# Patient Record
Sex: FEMALE | Race: WHITE | NOT HISPANIC OR LATINO
[De-identification: names, ages, dates, MRNs, and addresses within clinical notes are randomized per-mention and may not be internally consistent; named-entity substitution may affect disease eponyms.]

---

## 2018-07-16 PROBLEM — Z00.00 ENCOUNTER FOR PREVENTIVE HEALTH EXAMINATION: Status: ACTIVE | Noted: 2018-07-16

## 2018-07-25 ENCOUNTER — APPOINTMENT (OUTPATIENT)
Dept: SURGERY | Facility: CLINIC | Age: 78
End: 2018-07-25
Payer: MEDICARE

## 2018-07-25 VITALS
HEIGHT: 64 IN | SYSTOLIC BLOOD PRESSURE: 134 MMHG | BODY MASS INDEX: 30.05 KG/M2 | HEART RATE: 71 BPM | DIASTOLIC BLOOD PRESSURE: 85 MMHG | WEIGHT: 176 LBS

## 2018-07-25 DIAGNOSIS — Z80.0 FAMILY HISTORY OF MALIGNANT NEOPLASM OF DIGESTIVE ORGANS: ICD-10-CM

## 2018-07-25 DIAGNOSIS — Z86.39 PERSONAL HISTORY OF OTHER ENDOCRINE, NUTRITIONAL AND METABOLIC DISEASE: ICD-10-CM

## 2018-07-25 DIAGNOSIS — I48.91 UNSPECIFIED ATRIAL FIBRILLATION: ICD-10-CM

## 2018-07-25 DIAGNOSIS — Z86.79 PERSONAL HISTORY OF OTHER DISEASES OF THE CIRCULATORY SYSTEM: ICD-10-CM

## 2018-07-25 DIAGNOSIS — R73.03 PREDIABETES.: ICD-10-CM

## 2018-07-25 PROCEDURE — 99204 OFFICE O/P NEW MOD 45 MIN: CPT

## 2018-07-25 RX ORDER — NIFEDIPINE 60 MG/1
60 TABLET, FILM COATED, EXTENDED RELEASE ORAL
Refills: 0 | Status: ACTIVE | COMMUNITY

## 2018-07-25 RX ORDER — METOPROLOL TARTRATE 50 MG/1
50 TABLET, FILM COATED ORAL
Refills: 0 | Status: ACTIVE | COMMUNITY

## 2018-07-25 RX ORDER — ALENDRONATE SODIUM 70 MG/1
70 TABLET ORAL
Refills: 0 | Status: ACTIVE | COMMUNITY

## 2018-07-25 RX ORDER — RIVAROXABAN 20 MG/1
20 TABLET, FILM COATED ORAL
Refills: 0 | Status: ACTIVE | COMMUNITY

## 2018-07-25 RX ORDER — AMIODARONE HYDROCHLORIDE 200 MG/1
200 TABLET ORAL
Refills: 0 | Status: ACTIVE | COMMUNITY

## 2018-07-25 RX ORDER — SIMVASTATIN 40 MG/1
40 TABLET, FILM COATED ORAL
Refills: 0 | Status: ACTIVE | COMMUNITY

## 2018-07-30 PROBLEM — R73.03 BORDERLINE DIABETES MELLITUS: Status: RESOLVED | Noted: 2018-07-30 | Resolved: 2018-07-30

## 2018-07-30 PROBLEM — Z86.39 HISTORY OF HIGH CHOLESTEROL: Status: RESOLVED | Noted: 2018-07-30 | Resolved: 2018-07-30

## 2018-07-30 PROBLEM — Z80.0 FAMILY HISTORY OF MALIGNANT NEOPLASM OF COLON: Status: ACTIVE | Noted: 2018-07-30

## 2018-07-30 PROBLEM — Z86.79 HISTORY OF ATRIAL FIBRILLATION: Status: RESOLVED | Noted: 2018-07-30 | Resolved: 2018-07-30

## 2018-07-30 PROBLEM — Z86.79 HISTORY OF HYPERTENSION: Status: RESOLVED | Noted: 2018-07-30 | Resolved: 2018-07-30

## 2018-07-30 PROBLEM — I48.91 ATRIAL FIBRILLATION STATUS POST CARDIOVERSION: Status: RESOLVED | Noted: 2018-07-30 | Resolved: 2018-07-30

## 2018-08-08 ENCOUNTER — FORM ENCOUNTER (OUTPATIENT)
Age: 78
End: 2018-08-08

## 2018-08-09 ENCOUNTER — APPOINTMENT (OUTPATIENT)
Dept: CT IMAGING | Facility: CLINIC | Age: 78
End: 2018-08-09
Payer: MEDICARE

## 2018-08-09 ENCOUNTER — OUTPATIENT (OUTPATIENT)
Dept: OUTPATIENT SERVICES | Facility: HOSPITAL | Age: 78
LOS: 1 days | End: 2018-08-09

## 2018-08-09 DIAGNOSIS — E04.9 NONTOXIC GOITER, UNSPECIFIED: ICD-10-CM

## 2018-08-09 PROCEDURE — 70490 CT SOFT TISSUE NECK W/O DYE: CPT | Mod: 26

## 2018-09-10 ENCOUNTER — OUTPATIENT (OUTPATIENT)
Dept: OUTPATIENT SERVICES | Facility: HOSPITAL | Age: 78
LOS: 1 days | End: 2018-09-10
Payer: MEDICARE

## 2018-09-10 VITALS
SYSTOLIC BLOOD PRESSURE: 130 MMHG | OXYGEN SATURATION: 97 % | TEMPERATURE: 97 F | HEIGHT: 61.5 IN | WEIGHT: 184.09 LBS | RESPIRATION RATE: 14 BRPM | DIASTOLIC BLOOD PRESSURE: 82 MMHG | HEART RATE: 64 BPM

## 2018-09-10 DIAGNOSIS — E04.9 NONTOXIC GOITER, UNSPECIFIED: ICD-10-CM

## 2018-09-10 DIAGNOSIS — I48.91 UNSPECIFIED ATRIAL FIBRILLATION: ICD-10-CM

## 2018-09-10 DIAGNOSIS — Z96.652 PRESENCE OF LEFT ARTIFICIAL KNEE JOINT: Chronic | ICD-10-CM

## 2018-09-10 DIAGNOSIS — Z90.89 ACQUIRED ABSENCE OF OTHER ORGANS: Chronic | ICD-10-CM

## 2018-09-10 DIAGNOSIS — E11.9 TYPE 2 DIABETES MELLITUS WITHOUT COMPLICATIONS: ICD-10-CM

## 2018-09-10 DIAGNOSIS — Z01.89 ENCOUNTER FOR OTHER SPECIFIED SPECIAL EXAMINATIONS: Chronic | ICD-10-CM

## 2018-09-10 DIAGNOSIS — Z90.49 ACQUIRED ABSENCE OF OTHER SPECIFIED PARTS OF DIGESTIVE TRACT: Chronic | ICD-10-CM

## 2018-09-10 DIAGNOSIS — I10 ESSENTIAL (PRIMARY) HYPERTENSION: ICD-10-CM

## 2018-09-10 LAB
BLD GP AB SCN SERPL QL: NEGATIVE — SIGNIFICANT CHANGE UP
BUN SERPL-MCNC: 13 MG/DL — SIGNIFICANT CHANGE UP (ref 7–23)
CALCIUM SERPL-MCNC: 10.1 MG/DL — SIGNIFICANT CHANGE UP (ref 8.4–10.5)
CHLORIDE SERPL-SCNC: 100 MMOL/L — SIGNIFICANT CHANGE UP (ref 98–107)
CO2 SERPL-SCNC: 26 MMOL/L — SIGNIFICANT CHANGE UP (ref 22–31)
CREAT SERPL-MCNC: 0.83 MG/DL — SIGNIFICANT CHANGE UP (ref 0.5–1.3)
GLUCOSE SERPL-MCNC: 89 MG/DL — SIGNIFICANT CHANGE UP (ref 70–99)
HBA1C BLD-MCNC: 5.5 % — SIGNIFICANT CHANGE UP (ref 4–5.6)
HCT VFR BLD CALC: 44.1 % — SIGNIFICANT CHANGE UP (ref 34.5–45)
HGB BLD-MCNC: 14.3 G/DL — SIGNIFICANT CHANGE UP (ref 11.5–15.5)
MCHC RBC-ENTMCNC: 31.4 PG — SIGNIFICANT CHANGE UP (ref 27–34)
MCHC RBC-ENTMCNC: 32.4 % — SIGNIFICANT CHANGE UP (ref 32–36)
MCV RBC AUTO: 96.9 FL — SIGNIFICANT CHANGE UP (ref 80–100)
NRBC # FLD: 0 — SIGNIFICANT CHANGE UP
PLATELET # BLD AUTO: 311 K/UL — SIGNIFICANT CHANGE UP (ref 150–400)
PMV BLD: 11.2 FL — SIGNIFICANT CHANGE UP (ref 7–13)
POTASSIUM SERPL-MCNC: 3.7 MMOL/L — SIGNIFICANT CHANGE UP (ref 3.5–5.3)
POTASSIUM SERPL-SCNC: 3.7 MMOL/L — SIGNIFICANT CHANGE UP (ref 3.5–5.3)
RBC # BLD: 4.55 M/UL — SIGNIFICANT CHANGE UP (ref 3.8–5.2)
RBC # FLD: 13.4 % — SIGNIFICANT CHANGE UP (ref 10.3–14.5)
RH IG SCN BLD-IMP: POSITIVE — SIGNIFICANT CHANGE UP
SODIUM SERPL-SCNC: 141 MMOL/L — SIGNIFICANT CHANGE UP (ref 135–145)
T3 SERPL-MCNC: 115.8 NG/DL — SIGNIFICANT CHANGE UP (ref 80–200)
T4 FREE SERPL-MCNC: 1.89 NG/DL — HIGH (ref 0.9–1.8)
TSH SERPL-MCNC: 3 UIU/ML — SIGNIFICANT CHANGE UP (ref 0.27–4.2)
WBC # BLD: 7.47 K/UL — SIGNIFICANT CHANGE UP (ref 3.8–10.5)
WBC # FLD AUTO: 7.47 K/UL — SIGNIFICANT CHANGE UP (ref 3.8–10.5)

## 2018-09-10 PROCEDURE — 93010 ELECTROCARDIOGRAM REPORT: CPT

## 2018-09-10 RX ORDER — SIMVASTATIN 20 MG/1
1 TABLET, FILM COATED ORAL
Qty: 0 | Refills: 0 | COMMUNITY

## 2018-09-10 RX ORDER — SODIUM CHLORIDE 9 MG/ML
3 INJECTION INTRAMUSCULAR; INTRAVENOUS; SUBCUTANEOUS EVERY 8 HOURS
Qty: 0 | Refills: 0 | Status: DISCONTINUED | OUTPATIENT
Start: 2018-09-25 | End: 2018-10-10

## 2018-09-10 NOTE — H&P PST ADULT - MUSCULOSKELETAL
details… detailed exam no calf tenderness/normal strength/no joint erythema/no joint swelling/ROM intact/no joint warmth

## 2018-09-10 NOTE — H&P PST ADULT - NEGATIVE ENMT SYMPTOMS
no vertigo/no nasal discharge/no hearing difficulty/no ear pain/no nasal congestion/no sinus symptoms/no throat pain/no dysphagia

## 2018-09-10 NOTE — H&P PST ADULT - HISTORY OF PRESENT ILLNESS
poor historian 78 year old female presents to PST for nontoxic goiter.  Pt scheduled for total substernal thyroidectomy.  Pt has a history of A-Fibrillation, being seen by cardiologist Dr. Fofana.  Pt is on Xarelto.     deysi riddleian 78 year old female presents to PST for nontoxic goiter.  Pt scheduled for total substernal thyroidectomy  on 9/25/18.  Pt has a history of A-Fibrillation, being seen by cardiologist Dr. Fofana.  Pt is on Xarelto.     deysi riddleian

## 2018-09-10 NOTE — H&P PST ADULT - RS GEN PE MLT RESP DETAILS PC
breath sounds equal/no wheezes/respirations non-labored/airway patent/no rhonchi/no rales/good air movement/clear to auscultation bilaterally

## 2018-09-10 NOTE — H&P PST ADULT - PROBLEM SELECTOR PLAN 1
Pt scheduled for total substernal thyroidectomy on 9/25/18.  Pre-op instructions given, patient verbalized understanding.   Pepcid given to patient for GI prophylaxis.   Chlorhexidine wash provided, instructions given.     Medical evaluation requested by surgeon, pending medical clearance. Pt has appt with Dr. Henley 9/13.    Cardiac evaluation requested by surgeon, pending cardiac clearance. Pt has scheduled stress test and echo 9/11/18.

## 2018-09-10 NOTE — H&P PST ADULT - PMH
Atrial fibrillation    CAD (coronary artery disease)    Cataract, bilateral    Diabetes  Type II diet controlled  Hyperlipidemia    Hypertension    Low bone density    Spinal stenosis Atrial fibrillation    CAD (coronary artery disease)    Cataract, bilateral    Diabetes  Type II diet controlled  Hyperlipidemia    Hypertension    Low bone density    Nontoxic goiter, unspecified    Spinal stenosis

## 2018-09-10 NOTE — H&P PST ADULT - PROBLEM SELECTOR PLAN 2
Spoke to Surgical coordinator D of Dr. Eaton, as per Dr. Eaton pt should stop Xarelto one week prior to surgery and stay on a baby aspirin. Pt was instructed by Dr. Fofana to hold Xarelto 3 days prior to surgery. As per surgical coordinator, Dr. Eaton will speak to Dr. Fofana in regards to Xarelto instructions and relay plan to patient.

## 2018-09-10 NOTE — H&P PST ADULT - NEGATIVE NEUROLOGICAL SYMPTOMS
no focal seizures/no tremors/no vertigo/no paresthesias/no weakness/no syncope/no difficulty walking/no headache/no transient paralysis

## 2018-09-10 NOTE — H&P PST ADULT - PSH
Encounter for cardioversion procedure  April 2018  H/O total knee replacement, left  2011  S/P cholecystectomy  ~2008  S/P tonsillectomy and adenoidectomy  childhood

## 2018-09-10 NOTE — H&P PST ADULT - NSANTHOSAYNRD_GEN_A_CORE
No. IGNACIA screening performed.  STOP BANG Legend: 0-2 = LOW Risk; 3-4 = INTERMEDIATE Risk; 5-8 = HIGH Risk

## 2018-09-19 ENCOUNTER — RESULT REVIEW (OUTPATIENT)
Age: 78
End: 2018-09-19

## 2018-09-24 ENCOUNTER — TRANSCRIPTION ENCOUNTER (OUTPATIENT)
Age: 78
End: 2018-09-24

## 2018-09-25 ENCOUNTER — OUTPATIENT (OUTPATIENT)
Dept: OUTPATIENT SERVICES | Facility: HOSPITAL | Age: 78
LOS: 1 days | Discharge: HOME | End: 2018-09-25
Payer: MEDICARE

## 2018-09-25 ENCOUNTER — RESULT REVIEW (OUTPATIENT)
Age: 78
End: 2018-09-25

## 2018-09-25 ENCOUNTER — APPOINTMENT (OUTPATIENT)
Dept: SURGERY | Facility: HOSPITAL | Age: 78
End: 2018-09-25

## 2018-09-25 VITALS
HEIGHT: 61.5 IN | WEIGHT: 184.09 LBS | SYSTOLIC BLOOD PRESSURE: 138 MMHG | TEMPERATURE: 98 F | HEART RATE: 64 BPM | DIASTOLIC BLOOD PRESSURE: 68 MMHG | OXYGEN SATURATION: 95 % | RESPIRATION RATE: 16 BRPM

## 2018-09-25 VITALS
DIASTOLIC BLOOD PRESSURE: 69 MMHG | SYSTOLIC BLOOD PRESSURE: 126 MMHG | RESPIRATION RATE: 16 BRPM | OXYGEN SATURATION: 98 % | HEART RATE: 62 BPM

## 2018-09-25 DIAGNOSIS — Z01.89 ENCOUNTER FOR OTHER SPECIFIED SPECIAL EXAMINATIONS: Chronic | ICD-10-CM

## 2018-09-25 DIAGNOSIS — Z90.89 ACQUIRED ABSENCE OF OTHER ORGANS: Chronic | ICD-10-CM

## 2018-09-25 DIAGNOSIS — E04.9 NONTOXIC GOITER, UNSPECIFIED: ICD-10-CM

## 2018-09-25 DIAGNOSIS — Z96.652 PRESENCE OF LEFT ARTIFICIAL KNEE JOINT: Chronic | ICD-10-CM

## 2018-09-25 DIAGNOSIS — Z90.49 ACQUIRED ABSENCE OF OTHER SPECIFIED PARTS OF DIGESTIVE TRACT: Chronic | ICD-10-CM

## 2018-09-25 LAB
GLUCOSE BLDC GLUCOMTR-MCNC: 115 MG/DL — HIGH (ref 70–99)
RH IG SCN BLD-IMP: POSITIVE — SIGNIFICANT CHANGE UP

## 2018-09-25 PROCEDURE — 88307 TISSUE EXAM BY PATHOLOGIST: CPT | Mod: 26

## 2018-09-25 PROCEDURE — 60271 REMOVAL OF THYROID: CPT

## 2018-09-25 RX ORDER — BENZOCAINE AND MENTHOL 5; 1 G/100ML; G/100ML
1 LIQUID ORAL
Qty: 0 | Refills: 0 | COMMUNITY
Start: 2018-09-25

## 2018-09-25 RX ORDER — FENTANYL CITRATE 50 UG/ML
25 INJECTION INTRAVENOUS
Qty: 0 | Refills: 0 | Status: DISCONTINUED | OUTPATIENT
Start: 2018-09-25 | End: 2018-09-25

## 2018-09-25 RX ORDER — SODIUM CHLORIDE 9 MG/ML
1000 INJECTION, SOLUTION INTRAVENOUS
Qty: 0 | Refills: 0 | Status: DISCONTINUED | OUTPATIENT
Start: 2018-09-25 | End: 2018-09-25

## 2018-09-25 RX ORDER — NIFEDIPINE 30 MG
1 TABLET, EXTENDED RELEASE 24 HR ORAL
Qty: 0 | Refills: 0 | COMMUNITY

## 2018-09-25 RX ORDER — BENZOCAINE AND MENTHOL 5; 1 G/100ML; G/100ML
1 LIQUID ORAL
Qty: 0 | Refills: 0 | Status: DISCONTINUED | OUTPATIENT
Start: 2018-09-25 | End: 2018-10-10

## 2018-09-25 RX ORDER — RIVAROXABAN 15 MG-20MG
1 KIT ORAL
Qty: 0 | Refills: 0 | COMMUNITY

## 2018-09-25 RX ORDER — FENTANYL CITRATE 50 UG/ML
50 INJECTION INTRAVENOUS
Qty: 0 | Refills: 0 | Status: DISCONTINUED | OUTPATIENT
Start: 2018-09-25 | End: 2018-09-25

## 2018-09-25 RX ORDER — AMIODARONE HYDROCHLORIDE 400 MG/1
1 TABLET ORAL
Qty: 0 | Refills: 0 | COMMUNITY

## 2018-09-25 RX ORDER — ONDANSETRON 8 MG/1
4 TABLET, FILM COATED ORAL ONCE
Qty: 0 | Refills: 0 | Status: COMPLETED | OUTPATIENT
Start: 2018-09-25 | End: 2018-09-25

## 2018-09-25 RX ORDER — SIMVASTATIN 20 MG/1
1 TABLET, FILM COATED ORAL
Qty: 0 | Refills: 0 | COMMUNITY

## 2018-09-25 RX ORDER — CALCITRIOL 0.5 UG/1
1 CAPSULE ORAL
Qty: 0 | Refills: 0 | COMMUNITY
Start: 2018-09-25

## 2018-09-25 RX ORDER — OMEGA-3 ACID ETHYL ESTERS 1 G
1 CAPSULE ORAL
Qty: 0 | Refills: 0 | COMMUNITY

## 2018-09-25 RX ORDER — ACETAMINOPHEN 500 MG
650 TABLET ORAL EVERY 6 HOURS
Qty: 0 | Refills: 0 | Status: DISCONTINUED | OUTPATIENT
Start: 2018-09-25 | End: 2018-10-10

## 2018-09-25 RX ORDER — LOSARTAN/HYDROCHLOROTHIAZIDE 100MG-25MG
1 TABLET ORAL
Qty: 0 | Refills: 0 | COMMUNITY

## 2018-09-25 RX ORDER — ACETAMINOPHEN 500 MG
2 TABLET ORAL
Qty: 0 | Refills: 0 | COMMUNITY
Start: 2018-09-25

## 2018-09-25 RX ORDER — OXYCODONE HYDROCHLORIDE 5 MG/1
5 TABLET ORAL ONCE
Qty: 0 | Refills: 0 | Status: DISCONTINUED | OUTPATIENT
Start: 2018-09-25 | End: 2018-09-25

## 2018-09-25 RX ORDER — CHOLECALCIFEROL (VITAMIN D3) 125 MCG
1 CAPSULE ORAL
Qty: 0 | Refills: 0 | COMMUNITY

## 2018-09-25 RX ORDER — METHIMAZOLE 10 MG/1
1 TABLET ORAL
Qty: 0 | Refills: 0 | COMMUNITY

## 2018-09-25 RX ORDER — CALCITRIOL 0.5 UG/1
0.5 CAPSULE ORAL EVERY 12 HOURS
Qty: 0 | Refills: 0 | Status: DISCONTINUED | OUTPATIENT
Start: 2018-09-25 | End: 2018-10-10

## 2018-09-25 RX ORDER — SODIUM CHLORIDE 9 MG/ML
1000 INJECTION, SOLUTION INTRAVENOUS
Qty: 0 | Refills: 0 | Status: DISCONTINUED | OUTPATIENT
Start: 2018-09-25 | End: 2018-10-10

## 2018-09-25 RX ORDER — CALCITRIOL 0.5 UG/1
0.5 CAPSULE, LIQUID FILLED ORAL
Qty: 60 | Refills: 1 | Status: ACTIVE | COMMUNITY
Start: 2018-09-25 | End: 1900-01-01

## 2018-09-25 RX ORDER — METOPROLOL TARTRATE 50 MG
1 TABLET ORAL
Qty: 0 | Refills: 0 | COMMUNITY

## 2018-09-25 RX ORDER — ALENDRONATE SODIUM 70 MG/1
1 TABLET ORAL
Qty: 0 | Refills: 0 | COMMUNITY

## 2018-09-25 RX ADMIN — FENTANYL CITRATE 50 MICROGRAM(S): 50 INJECTION INTRAVENOUS at 14:00

## 2018-09-25 RX ADMIN — ONDANSETRON 4 MILLIGRAM(S): 8 TABLET, FILM COATED ORAL at 13:42

## 2018-09-25 RX ADMIN — CALCITRIOL 0.5 MICROGRAM(S): 0.5 CAPSULE ORAL at 17:33

## 2018-09-25 RX ADMIN — SODIUM CHLORIDE 30 MILLILITER(S): 9 INJECTION, SOLUTION INTRAVENOUS at 10:59

## 2018-09-25 RX ADMIN — Medication 2 TABLET(S): at 15:49

## 2018-09-25 RX ADMIN — SODIUM CHLORIDE 75 MILLILITER(S): 9 INJECTION, SOLUTION INTRAVENOUS at 13:50

## 2018-09-25 RX ADMIN — BENZOCAINE AND MENTHOL 1 LOZENGE: 5; 1 LIQUID ORAL at 17:33

## 2018-09-25 RX ADMIN — SODIUM CHLORIDE 3 MILLILITER(S): 9 INJECTION INTRAMUSCULAR; INTRAVENOUS; SUBCUTANEOUS at 13:42

## 2018-09-25 RX ADMIN — FENTANYL CITRATE 50 MICROGRAM(S): 50 INJECTION INTRAVENOUS at 14:15

## 2018-09-25 NOTE — ASU PATIENT PROFILE, ADULT - PMH
Atrial fibrillation    CAD (coronary artery disease)    Cataract, bilateral    Diabetes  Type II diet controlled  Hyperlipidemia    Hypertension    Low bone density    Nontoxic goiter, unspecified    Spinal stenosis

## 2018-09-25 NOTE — BRIEF OPERATIVE NOTE - OPERATION/FINDINGS
Total thyroidectomy. Recurrent laryngeal nerve identified bilaterally. Drain left in pre-tracheal space.

## 2018-09-25 NOTE — ASU DISCHARGE PLAN (ADULT/PEDIATRIC). - MEDICATION SUMMARY - MEDICATIONS TO STOP TAKING
I will STOP taking the medications listed below when I get home from the hospital:    Xarelto 20 mg oral tablet  -- 1 tab(s) by mouth once a day in am    methIMAzole 5 mg oral tablet  -- 1 tab(s) by mouth once a day in am    calcium 1200 mg orally 2x/day    Vitamin D3 1000 intl units oral tablet  -- 1 tab(s) by mouth 2 times a day    Fish Oil 1200 mg oral capsule  -- 1 cap(s) by mouth once a day last dose 9/18    MVI 1 tab orally daily  last dose  9 /18    COQ10 100 mg orally daily  last dose 9/18

## 2018-09-25 NOTE — ASU DISCHARGE PLAN (ADULT/PEDIATRIC). - MEDICATION SUMMARY - MEDICATIONS TO TAKE
I will START or STAY ON the medications listed below when I get home from the hospital:    acetaminophen 325 mg oral tablet  -- 2 tab(s) by mouth every 6 hours, As needed, Moderate Pain (4 - 6)  -- Indication: For pain    amiodarone 200 mg oral tablet  -- 1 tab(s) by mouth once a day in am  -- Indication: For home    simvastatin 40 mg oral tablet  -- 1 tab(s) by mouth once a day in am  -- Indication: For home    losartan-hydrochlorothiazide 50mg-12.5mg oral tablet  -- 1 tab(s) by mouth once a day in am  -- Indication: For home    metoprolol tartrate 50 mg oral tablet  -- 1 tab(s) by mouth 2 times a day  -- Indication: For hme    Fosamax 70 mg oral tablet  -- 1 tab(s) by mouth once a week on Monday  -- Indication: For hme    NIFEdipine 60 mg oral tablet, extended release  -- 1 tab(s) by mouth 2 times a day  -- Indication: For home    benzocaine-menthol 15 mg-3.6 mg mucous membrane lozenge  -- 1  mucous membrane every 4 hours, As Needed  -- Indication: For pain    calcium-vitamin D 500 mg-200 intl units oral tablet  -- 2 tab(s) by mouth 3 times a day  -- Indication: For postop    calcitriol 0.5 mcg oral capsule  -- 1 cap(s) by mouth every 12 hours  -- Indication: For postop I will START or STAY ON the medications listed below when I get home from the hospital:    acetaminophen 325 mg oral tablet  -- 2 tab(s) by mouth every 6 hours, As needed, Moderate Pain (4 - 6)  -- Indication: For pain    amiodarone 200 mg oral tablet  -- 1 tab(s) by mouth once a day in am  -- Indication: For home    simvastatin 40 mg oral tablet  -- 1 tab(s) by mouth once a day in am  -- Indication: For home    losartan-hydrochlorothiazide 50mg-12.5mg oral tablet  -- 1 tab(s) by mouth once a day in am  -- Indication: For home    metoprolol tartrate 50 mg oral tablet  -- 1 tab(s) by mouth 2 times a day  -- Indication: For home    Fosamax 70 mg oral tablet  -- 1 tab(s) by mouth once a week on Monday  -- Indication: For home    NIFEdipine 60 mg oral tablet, extended release  -- 1 tab(s) by mouth 2 times a day  -- Indication: For home    benzocaine-menthol 15 mg-3.6 mg mucous membrane lozenge  -- 1  mucous membrane every 4 hours, As Needed  -- Indication: For pain    calcium-vitamin D 500 mg-200 intl units oral tablet  -- 2 tab(s) by mouth 3 times a day  -- Indication: For postop    calcitriol 0.5 mcg oral capsule  -- 1 cap(s) by mouth every 12 hours  -- Indication: For postop

## 2018-09-25 NOTE — BRIEF OPERATIVE NOTE - PROCEDURE
<<-----Click on this checkbox to enter Procedure Total thyroidectomy and radical neck dissection including removal of substernal thyroid by cervical approach  09/25/2018    Active  [id number]

## 2018-09-25 NOTE — ASU DISCHARGE PLAN (ADULT/PEDIATRIC). - NOTIFY
Fever greater than 101/Bleeding that does not stop Fever greater than 101/Pain not relieved by Medications/Inability to Tolerate Liquids or Foods/Persistent Nausea and Vomiting/Unable to Urinate/Bleeding that does not stop/Swelling that continues

## 2018-09-26 ENCOUNTER — APPOINTMENT (OUTPATIENT)
Dept: SURGERY | Facility: CLINIC | Age: 78
End: 2018-09-26
Payer: MEDICARE

## 2018-09-26 DIAGNOSIS — E04.9 NONTOXIC GOITER, UNSPECIFIED: ICD-10-CM

## 2018-09-26 PROBLEM — I10 ESSENTIAL (PRIMARY) HYPERTENSION: Chronic | Status: ACTIVE | Noted: 2018-09-10

## 2018-09-26 PROBLEM — H26.9 UNSPECIFIED CATARACT: Chronic | Status: ACTIVE | Noted: 2018-09-10

## 2018-09-26 PROBLEM — I48.91 UNSPECIFIED ATRIAL FIBRILLATION: Chronic | Status: ACTIVE | Noted: 2018-09-10

## 2018-09-26 PROBLEM — M48.00 SPINAL STENOSIS, SITE UNSPECIFIED: Chronic | Status: ACTIVE | Noted: 2018-09-10

## 2018-09-26 PROBLEM — M85.80 OTHER SPECIFIED DISORDERS OF BONE DENSITY AND STRUCTURE, UNSPECIFIED SITE: Chronic | Status: ACTIVE | Noted: 2018-09-10

## 2018-09-26 PROBLEM — E11.9 TYPE 2 DIABETES MELLITUS WITHOUT COMPLICATIONS: Chronic | Status: ACTIVE | Noted: 2018-09-10

## 2018-09-26 PROBLEM — I25.10 ATHEROSCLEROTIC HEART DISEASE OF NATIVE CORONARY ARTERY WITHOUT ANGINA PECTORIS: Chronic | Status: ACTIVE | Noted: 2018-09-10

## 2018-09-26 PROBLEM — E78.5 HYPERLIPIDEMIA, UNSPECIFIED: Chronic | Status: ACTIVE | Noted: 2018-09-10

## 2018-09-26 PROCEDURE — 99024 POSTOP FOLLOW-UP VISIT: CPT

## 2018-09-28 LAB — SURGICAL PATHOLOGY STUDY: SIGNIFICANT CHANGE UP

## 2018-10-03 ENCOUNTER — APPOINTMENT (OUTPATIENT)
Dept: SURGERY | Facility: CLINIC | Age: 78
End: 2018-10-03
Payer: MEDICARE

## 2018-10-03 DIAGNOSIS — D49.7 NEOPLASM OF UNSPECIFIED BEHAVIOR OF ENDOCRINE GLANDS AND OTHER PARTS OF NERVOUS SYSTEM: ICD-10-CM

## 2018-10-03 PROCEDURE — 99024 POSTOP FOLLOW-UP VISIT: CPT

## 2018-11-29 ENCOUNTER — RX RENEWAL (OUTPATIENT)
Age: 78
End: 2018-11-29

## 2019-05-15 ENCOUNTER — LABORATORY RESULT (OUTPATIENT)
Age: 79
End: 2019-05-15

## 2019-05-15 ENCOUNTER — APPOINTMENT (OUTPATIENT)
Dept: SURGERY | Facility: CLINIC | Age: 79
End: 2019-05-15
Payer: MEDICARE

## 2019-05-15 DIAGNOSIS — C73 MALIGNANT NEOPLASM OF THYROID GLAND: ICD-10-CM

## 2019-05-15 PROCEDURE — 99213 OFFICE O/P EST LOW 20 MIN: CPT

## 2019-05-15 PROCEDURE — 36415 COLL VENOUS BLD VENIPUNCTURE: CPT

## 2019-05-15 RX ORDER — LEVOTHYROXINE SODIUM 125 UG/1
125 TABLET ORAL
Refills: 0 | Status: ACTIVE | COMMUNITY
Start: 2018-10-03

## 2019-05-15 NOTE — PHYSICAL EXAM
[de-identified] : no cervical or supraclavicular adenopathy, trachea midline with cricoid at sternal notch, inciision healing well  [Normal] : orientation to person, place, and time: normal

## 2019-05-15 NOTE — HISTORY OF PRESENT ILLNESS
[de-identified] : Patient referred by Dr. Henley for evaluation of suspicious thyroid nodule in [facility].  Patient with 4 year history of [facility]. Prior biopsies benign.  Recent US with new 2 cm isthmus nodule.  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  TSH 2.8, free T4 2.14.  Denies dysphagia, hoarseness radiation exposure or recent stress test.  \par 9/25/18 total thyroidectomy with resection substernal goiter.  Doing well, denies dysphagia, hoarseness or parathesias. \par pathology 6 mm papillary thyroid cancer. going to [place] in 2 weeks will f/u there for synthroid adjustment.\par US 1/2019 no suspicious findings, no evidence of recurrence.  reports occasional fatigue currently on 125 no recent venil.l

## 2019-05-17 LAB
T3 SERPL-MCNC: 65 NG/DL
T4 FREE SERPL-MCNC: 2.2 NG/DL
THYROGLOB AB SERPL-ACNC: <20 IU/ML
THYROGLOB SERPL-MCNC: 0.54 NG/ML
TSH SERPL-ACNC: 3.2 UIU/ML

## 2019-06-10 NOTE — ASU PATIENT PROFILE, ADULT - NSCAFFEINETYPE_GEN_ALL_CORE_SD

## 2020-05-20 ENCOUNTER — APPOINTMENT (OUTPATIENT)
Dept: SURGERY | Facility: CLINIC | Age: 80
End: 2020-05-20

## 2021-07-31 NOTE — H&P PST ADULT - FALLEN IN THE PAST
[facility] Slick Ruth MD    [phone] no

## 2025-02-14 NOTE — ASU PREOP CHECKLIST - 2.
Problem: Fall/Injury  Goal: Not fall by end of shift  Outcome: Progressing  Goal: Be free from injury by end of the shift  Outcome: Progressing  Goal: Verbalize understanding of personal risk factors for fall in the hospital  Outcome: Progressing  Goal: Verbalize understanding of risk factor reduction measures to prevent injury from fall in the home  Outcome: Progressing  Goal: Use assistive devices by end of the shift  Outcome: Progressing  Goal: Pace activities to prevent fatigue by end of the shift  Outcome: Progressing     Problem: Respiratory  Goal: Clear secretions with interventions this shift  Outcome: Progressing  Goal: Minimize anxiety/maximize coping throughout shift  Outcome: Progressing  Goal: Minimal/no exertional discomfort or dyspnea this shift  Outcome: Progressing  Goal: No signs of respiratory distress (eg. Use of accessory muscles. Peds grunting)  Outcome: Progressing  Goal: Patent airway maintained this shift  Outcome: Progressing  Goal: Tolerate mechanical ventilation evidenced by VS/agitation level this shift  Outcome: Progressing  Goal: Tolerate pulmonary toileting this shift  Outcome: Progressing  Goal: Verbalize decreased shortness of breath this shift  Outcome: Progressing  Goal: Wean oxygen to maintain O2 saturation per order/standard this shift  Outcome: Progressing  Goal: Increase self care and/or family involvement in next 24 hours  Outcome: Progressing     Problem: Pain - Adult  Goal: Verbalizes/displays adequate comfort level or baseline comfort level  Outcome: Progressing  Flowsheets (Taken 2/14/2025 0151)  Verbalizes/displays adequate comfort level or baseline comfort level:   Assess pain using appropriate pain scale   Administer analgesics based on type and severity of pain and evaluate response   Implement non-pharmacological measures as appropriate and evaluate response     Problem: Safety - Adult  Goal: Free from fall injury  Outcome: Progressing     Problem: Discharge  Planning  Goal: Discharge to home or other facility with appropriate resources  Outcome: Progressing     Problem: Chronic Conditions and Co-morbidities  Goal: Patient's chronic conditions and co-morbidity symptoms are monitored and maintained or improved  Outcome: Progressing     Problem: Nutrition  Goal: Nutrient intake appropriate for maintaining nutritional needs  Outcome: Progressing     Problem: Skin  Goal: Decreased wound size/increased tissue granulation at next dressing change  Outcome: Progressing  Flowsheets (Taken 2/14/2025 0151)  Decreased wound size/increased tissue granulation at next dressing change:   Promote sleep for wound healing   Protective dressings over bony prominences  Goal: Participates in plan/prevention/treatment measures  Outcome: Progressing  Flowsheets (Taken 2/14/2025 0151)  Participates in plan/prevention/treatment measures:   Discuss with provider PT/OT consult   Elevate heels   Increase activity/out of bed for meals  Goal: Prevent/manage excess moisture  Outcome: Progressing  Flowsheets (Taken 2/14/2025 0151)  Prevent/manage excess moisture: Monitor for/manage infection if present  Goal: Prevent/minimize sheer/friction injuries  Outcome: Progressing  Flowsheets (Taken 2/14/2025 0151)  Prevent/minimize sheer/friction injuries:   Increase activity/out of bed for meals   Turn/reposition every 2 hours/use positioning/transfer devices  Goal: Promote/optimize nutrition  Outcome: Progressing  Flowsheets (Taken 2/14/2025 0151)  Promote/optimize nutrition:   Monitor/record intake including meals   Consume > 50% meals/supplements  Goal: Promote skin healing  Outcome: Progressing  Flowsheets (Taken 2/14/2025 0151)  Promote skin healing:   Protective dressings over bony prominences   Assess skin/pad under line(s)/device(s)   Turn/reposition every 2 hours/use positioning/transfer devices      sister in law gordy in waiting room